# Patient Record
(demographics unavailable — no encounter records)

---

## 2017-01-12 NOTE — RAD
EXAM: Dual modality PET/CT.



HISTORY: Lung cancer restaging.



TECHNIQUE: PET images of the body from the skull base to the proximal thighs

were obtained approximately 60 minutes following the intravenous

administration of 13.18 mCi F-18 fluorodeoxyglucose. Noncontrast CT images

were obtained for attenuation correction purposes. The blood glucose level

prior to contrast administration was 76 mg/dl. 



One or more of the following individualized dose reduction techniques were

utilized for this examination:

1. Automated exposure control.

2. Adjustment of the mA and/or kV according to patient size.

3. Use of iterative reconstruction technique.



COMPARISON: 7/14/2016.



FINDINGS: There is mild increased radiotracer activity within maximum SUV of

2.9 within the right suprahilar bandlike consolidation containing air

bronchograms, stable compared to the prior study. There is increased

radiotracer activity with a maximum SUV of 6.6 within a 1.5 cm right thyroid

nodule, previously demonstrating a maximum SUV of 6.2. There is increased

tracer tracer activity within the right shoulder girdle musculature, likely

physiologic given the absence of a CT correlate. There is physiologic activity

within the bowel and renal collecting system.



The CT portion of the exam demonstrates stable right suprahilar bandlike

consolidation containing air bronchograms. There is stable linear scarring

within the lateral left upper lobe. There is a stable 2 mm pleural-based

nodular opacity within the right upper lobe. There is emphysema. There is no

pneumothorax or pleural effusion. There is cardiomegaly with coronary artery

atherosclerosis and calcification of the aortic valve. No pathologically

enlarged lymph node is seen. There is an enlarged thyroid and 1.5 cm right

thyroid nodule. There is cerebral volume loss with compensatory enlargement of

the ventricles and evidence of chronic small vessel disease within the

cerebral white matter. There is severe mucosal thickening involving the

sphenoid sinus. There is an ectatic aortic arch.



There is a small hiatal hernia. No hepatic lesion is seen. The gallbladder is

unremarkable. There are a few pancreatic calcifications likely due to chronic

pancreatitis. The spleen is unremarkable. No adrenal gland lesion is seen.

There is a 2 cm hypodense lesion within the lower pole of the left kidney,

likely a cyst. There is colonic diverticulosis without evidence of

diverticulitis. There is no evidence of bowel obstruction. There is a tortuous

atherosclerotic abdominal aorta and iliac bifurcation. There is a diverticulum

along the right aspect of the urinary bladder. There are advanced degenerative

changes throughout the spine. There is a sclerotic lesion within the left

femoral intertrochanteric region, possibly due to a bone infarct. No new

suspicious lytic or sclerotic osseous lesion is seen.



IMPRESSION:

1. Stable mild increased radiotracer activity with a maximum SUV of 2.9 within

right suprahilar bandlike consolidation containing air bronchograms. The

stability and degree of right tracer activity favor post radiation change

right than recurrent or residual neoplasm. Continued follow-up is indicated.

2. Slight interval increase in a radiotracer avid right thyroid nodule with a

maximum SUV of 6.6. This can be better assessed sonographically.

3. Stable tiny pleural-based nodular and linear opacities within both lungs,

likely benign.

4. Emphysema. 

5. Cardiomegaly with coronary artery atherosclerosis and calcification of the

aortic valve.

6. Pancreatic calcifications likely due to the sequela of chronic

pancreatitis. 

7. Hypodense lesion within the lower pole of the left kidney, likely a cyst. 

8. Colonic diverticulosis.

9. Please refer to the above report for additional findings regarding the

non-PET portion of the exam.

## 2017-07-28 NOTE — RAD
EXAM: PET/CT SKULL BASE TO MID THIGH.



HISTORY:  Restaging right lung carcinoma status post completion of

chemoradiation approximately 2 years ago.



COMPARISON: Multiple prior PET/CT examinations with most recent generator 12 2017 and 7/14/2016..



TECHNIQUE: CT was performed from the skull base through the mid thighs for the

purposes of attenuation correction.  14.1 mCi F-18 fluorodeoxyglucose (FDG)

was administered intravenously. After an uptake period, positron emission

tomography was performed from the skull base through the mid thighs. The PET

and CT data were fused and interpreted in combination a dedicated workstation.

Blood glucose level was 124 mg/dL at the time of FDG administration.



Findings:



Head/neck: There is a 1.0 cm hypodense nodule in the inferior right thyroid

gland with persistent FDG activity with maximum SUV of 6.1, previously 6.2. No

hypermetabolic lymphadenopathy in this region.



Chest: Right upper lobe volume loss with right suprahilar consolidation and

bronchiectasis with contiguous soft tissue extension into the right deana

compatible with postradiation fibrosis which demonstrates mild low level FDG

uptake with maximum SUV of 2.8, previously 2.9. No suspicious hypermetabolic

mass or lymphadenopathy in this region.



Abdomen and pelvis: There is physiologic activity in both kidneys and urinary

bladder. There is patchy increased FDG uptake throughout the large bowel, may

be physiologic.



Musculoskeletal: No suspicious focal FDG uptake.



Uncorrected PET images: No additional finding.



Noncontrast low-dose CT findings: Coronary artery and aortic valve leaflet

calcifications. Stable small right renal cysts. Tortuosity of the abdominal

aorta with heavy aortoiliac calcified atheromatous disease with luminal

patency not assessed due to lack of intravenous contrast. Moderate distal

clonic diverticulosis without evidence for diverticulitis.



Impression:

1. Unchanged low level metabolic activity within the right upper lobe

consolidation compatible with postradiation fibrosis, though early recurrent

neoplasm cannot be excluded.

2. Stable 1.0 cm right thyroid nodule with hypermetabolic activity, unchanged

since 2014, indeterminate. Dedicated thyroid ultrasound is recommended, if not

already performed.

3. No hypermetabolic lymphadenopathy in the neck, chest, abdomen or pelvis.